# Patient Record
Sex: FEMALE | Race: WHITE | NOT HISPANIC OR LATINO | Employment: OTHER | URBAN - NONMETROPOLITAN AREA
[De-identification: names, ages, dates, MRNs, and addresses within clinical notes are randomized per-mention and may not be internally consistent; named-entity substitution may affect disease eponyms.]

---

## 2019-08-02 ENCOUNTER — INITIAL PRENATAL (OUTPATIENT)
Dept: OBSTETRICS AND GYNECOLOGY | Facility: CLINIC | Age: 24
End: 2019-08-02

## 2019-08-02 VITALS
BODY MASS INDEX: 17.85 KG/M2 | SYSTOLIC BLOOD PRESSURE: 118 MMHG | DIASTOLIC BLOOD PRESSURE: 62 MMHG | WEIGHT: 97 LBS | HEIGHT: 62 IN

## 2019-08-02 DIAGNOSIS — Z34.91 NORMAL PREGNANCY, FIRST TRIMESTER: ICD-10-CM

## 2019-08-02 DIAGNOSIS — Z34.90 PREGNANCY, UNSPECIFIED GESTATIONAL AGE: Primary | ICD-10-CM

## 2019-08-02 LAB
C TRACH RRNA SPEC DONR QL NAA+PROBE: NEGATIVE
N GONORRHOEA DNA SPEC QL NAA+PROBE: NEGATIVE

## 2019-08-02 PROCEDURE — 99203 OFFICE O/P NEW LOW 30 MIN: CPT | Performed by: NURSE PRACTITIONER

## 2019-08-02 RX ORDER — PRENATAL VIT NO.126/IRON/FOLIC 28MG-0.8MG
1 TABLET ORAL DAILY
COMMUNITY
End: 2022-08-08

## 2019-08-02 NOTE — PROGRESS NOTES
"Chief Complaint   Patient presents with   • Initial Prenatal Visit     LMP 19, 9w4d by scan today, pap due, no complaints         HPI  , 9w4d presents to our office today for initial prenatal visit.  She reports mild first trimester discomforts of pregnancy.      She and her  are happy with the pregnancy.  She does not smoke cigarettes or use recreational drugs or alcohol  She is taking her prenatal vitamin.    Previous pregnancies spontaneous vaginal deliveries without complications.  6 pounds 4 ounces      The following portions of the patient's history were reviewed as note below and updated   current medications allergies, past family history, past medical history, past social history and past surgical history.    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\"        Social History     Socioeconomic History   • Marital status:      Spouse name: Not on file   • Number of children: Not on file   • Years of education: Not on file   • Highest education level: Not on file   Tobacco Use   • Smoking status: Never Smoker   • Smokeless tobacco: Never Used   Substance and Sexual Activity   • Alcohol use: No     Frequency: Never   • Drug use: No   • Sexual activity: Yes     Partners: Male     Birth control/protection: None      Family History   Problem Relation Age of Onset   • Diabetes Maternal Grandmother        ROS  Pertinent items are noted in HPI, all other systems reviewed and negative      Physical Exam  /62   Ht 157.5 cm (62\")   Wt 44 kg (97 lb)   LMP 2019 (Approximate)   BMI 17.74 kg/m²        Psych: Altert and oriented to time, place and person  Mood and affect appropriate "   General: well developed; well nourished  no acute distress  Head: normocephalic  Neck: The neck is supple and the trachea is midline  Musculoskeletal: Normal gait  Full range of motion  Lungs:  breathing is unlabored  Back: Negative CVAT  Abdomen: Gravid - soft and non-tender   Lower Extremities: LE: Neg edema  Genitourinary: External Genitalia without erythema, lesions, or masses, Vagina - There is no excessive vaginal discharge & vaginal walls reveals moist vaginal mucosa without inflammation or lesions. There is not evidence of pelvic relaxation , Cervix  is smooth. Uterus &  Adnexa is noted per ultrasound.  Perineum is without inflammation or lesions      MDM  Impression:  Problems/Risks: Normal Pregnancy     Tests done today: Pap  Routine NOB labs / U/A Culture / GC/CT - option for CF screening   TVS rev'd    Topics discussed: Rout NOB education including nutrition, exercise, OTCmeds, genetic screening & timing   Written info on  1st trimester of pregnancy  Encourage questions & answered    Tests next visit: none

## 2019-08-02 NOTE — PATIENT INSTRUCTIONS
First Trimester of Pregnancy    The first trimester of pregnancy is from week 1 until the end of week 12 (months 1 through 3). During this time, your baby will begin to develop inside you. At 6-8 weeks, the eyes and face are formed, and the heartbeat can be seen on ultrasound. At the end of 12 weeks, all the baby's organs are formed. Prenatal care is all the medical care you receive before the birth of your baby. Make sure you get good prenatal care and follow all of your doctor's instructions.  HOME CARE   Medicines  · Take medicine only as told by your doctor. Some medicines are safe and some are not during pregnancy.  · Take your prenatal vitamins as told by your doctor.  · Take medicine that helps you poop (stool softener) as needed if your doctor says it is okay.  Diet  · Eat regular, healthy meals.  · Your doctor will tell you the amount of weight gain that is right for you.  · Avoid raw meat and uncooked cheese.  · If you feel sick to your stomach (nauseous) or throw up (vomit):  ¨ Eat 4 or 5 small meals a day instead of 3 large meals.  ¨ Try eating a few soda crackers.  ¨ Drink liquids between meals instead of during meals.  · If you have a hard time pooping (constipation):  ¨ Eat high-fiber foods like fresh vegetables, fruit, and whole grains.  ¨ Drink enough fluids to keep your pee (urine) clear or pale yellow.  Activity and Exercise  · Exercise only as told by your doctor. Stop exercising if you have cramps or pain in your lower belly (abdomen) or low back.  · Try to avoid standing for long periods of time. Move your legs often if you must  one place for a long time.  · Avoid heavy lifting.  · Wear low-heeled shoes. Sit and stand up straight.  · You can have sex unless your doctor tells you not to.  Relief of Pain or Discomfort  · Wear a good support bra if your breasts are sore.  · Take warm water baths (sitz baths) to soothe pain or discomfort caused by hemorrhoids. Use hemorrhoid cream if your  doctor says it is okay.  · Rest with your legs raised if you have leg cramps or low back pain.  · Wear support hose if you have puffy, bulging veins (varicose veins) in your legs. Raise (elevate) your feet for 15 minutes, 3-4 times a day. Limit salt in your diet.  Prenatal Care  · Schedule your prenatal visits by the twelfth week of pregnancy.  · Write down your questions. Take them to your prenatal visits.  · Keep all your prenatal visits as told by your doctor.  Safety  · Wear your seat belt at all times when driving.  · Make a list of emergency phone numbers. The list should include numbers for family, friends, the hospital, and police and fire departments.  General Tips  · Ask your doctor for a referral to a local prenatal class. Begin classes no later than at the start of month 6 of your pregnancy.  · Ask for help if you need counseling or help with nutrition. Your doctor can give you advice or tell you where to go for help.  · Do not use hot tubs, steam rooms, or saunas.  · Do not douche or use tampons or scented sanitary pads.  · Do not cross your legs for long periods of time.  · Avoid litter boxes and soil used by cats.  · Avoid all smoking, herbs, and alcohol. Avoid drugs not approved by your doctor.  · Do not use any tobacco products, including cigarettes, chewing tobacco, and electronic cigarettes. If you need help quitting, ask your doctor. You may get counseling or other support to help you quit.  · Visit your dentist. At home, brush your teeth with a soft toothbrush. Be gentle when you floss.  GET HELP IF:  · You are dizzy.  · You have mild cramps or pressure in your lower belly.  · You have a nagging pain in your belly area.  · You continue to feel sick to your stomach, throw up, or have watery poop (diarrhea).  · You have a bad smelling fluid coming from your vagina.  · You have pain with peeing (urination).  · You have increased puffiness (swelling) in your face, hands, legs, or ankles.  GET HELP  RIGHT AWAY IF:   · You have a fever.  · You are leaking fluid from your vagina.  · You have spotting or bleeding from your vagina.  · You have very bad belly cramping or pain.  · You gain or lose weight rapidly.  · You throw up blood. It may look like coffee grounds.  · You are around people who have [place] measles, fifth disease, or chickenpox.  · You have a very bad headache.  · You have shortness of breath.  · You have any kind of trauma, such as from a fall or a car accident.     This information is not intended to replace advice given to you by your health care provider. Make sure you discuss any questions you have with your health care provider.

## 2019-08-03 LAB
ABO GROUP BLD: (no result)
BASOPHILS # BLD AUTO: 0 X10E3/UL (ref 0–0.2)
BASOPHILS NFR BLD AUTO: 0 %
BLD GP AB SCN SERPL QL: NEGATIVE
EOSINOPHIL # BLD AUTO: 0.2 X10E3/UL (ref 0–0.4)
EOSINOPHIL NFR BLD AUTO: 3 %
ERYTHROCYTE [DISTWIDTH] IN BLOOD BY AUTOMATED COUNT: 12.8 % (ref 12.3–15.4)
HBV SURFACE AG SERPL QL IA: NEGATIVE
HCT VFR BLD AUTO: 33.9 % (ref 34–46.6)
HCV AB S/CO SERPL IA: <0.1 S/CO RATIO (ref 0–0.9)
HGB BLD-MCNC: 11.6 G/DL (ref 11.1–15.9)
HIV 1+2 AB+HIV1 P24 AG SERPL QL IA: NON REACTIVE
IMM GRANULOCYTES # BLD AUTO: 0 X10E3/UL (ref 0–0.1)
IMM GRANULOCYTES NFR BLD AUTO: 0 %
LYMPHOCYTES # BLD AUTO: 1.6 X10E3/UL (ref 0.7–3.1)
LYMPHOCYTES NFR BLD AUTO: 22 %
MCH RBC QN AUTO: 30.9 PG (ref 26.6–33)
MCHC RBC AUTO-ENTMCNC: 34.2 G/DL (ref 31.5–35.7)
MCV RBC AUTO: 90 FL (ref 79–97)
MONOCYTES # BLD AUTO: 0.5 X10E3/UL (ref 0.1–0.9)
MONOCYTES NFR BLD AUTO: 6 %
NEUTROPHILS # BLD AUTO: 5.1 X10E3/UL (ref 1.4–7)
NEUTROPHILS NFR BLD AUTO: 69 %
PLATELET # BLD AUTO: 262 X10E3/UL (ref 150–450)
RBC # BLD AUTO: 3.76 X10E6/UL (ref 3.77–5.28)
RH BLD: POSITIVE
RPR SER QL: NON REACTIVE
RUBV IGG SERPL IA-ACNC: 1.92 INDEX
WBC # BLD AUTO: 7.4 X10E3/UL (ref 3.4–10.8)

## 2019-08-15 DIAGNOSIS — Z34.90 PREGNANCY, UNSPECIFIED GESTATIONAL AGE: ICD-10-CM

## 2019-09-13 ENCOUNTER — ROUTINE PRENATAL (OUTPATIENT)
Dept: OBSTETRICS AND GYNECOLOGY | Facility: CLINIC | Age: 24
End: 2019-09-13

## 2019-09-13 VITALS — BODY MASS INDEX: 18.11 KG/M2 | DIASTOLIC BLOOD PRESSURE: 60 MMHG | WEIGHT: 99 LBS | SYSTOLIC BLOOD PRESSURE: 112 MMHG

## 2019-09-13 DIAGNOSIS — Z34.92 NORMAL PREGNANCY, SECOND TRIMESTER: Primary | ICD-10-CM

## 2019-09-13 PROCEDURE — 99213 OFFICE O/P EST LOW 20 MIN: CPT | Performed by: NURSE PRACTITIONER

## 2019-09-13 NOTE — PATIENT INSTRUCTIONS
Second Trimester of Pregnancy  The second trimester is from week 13 through week 28, months 4 through 6. The second trimester is often a time when you feel your best. Your body has also adjusted to being pregnant, and you begin to feel better physically. Usually, morning sickness has lessened or quit completely, you may have more energy, and you may have an increase in appetite. The second trimester is also a time when the fetus is growing rapidly. At the end of the sixth month, the fetus is about 9 inches long and weighs about 1½ pounds. You will likely begin to feel the baby move (quickening) between 18 and 20 weeks of the pregnancy.  BODY CHANGES  Your body goes through many changes during pregnancy. The changes vary from woman to woman.   · Your weight will continue to increase. You will notice your lower abdomen bulging out.  · You may begin to get stretch marks on your hips, abdomen, and breasts.  · You may develop headaches that can be relieved by medicines approved by your health care provider.  · You may urinate more often because the fetus is pressing on your bladder.  · You may develop or continue to have heartburn as a result of your pregnancy.  · You may develop constipation because certain hormones are causing the muscles that push waste through your intestines to slow down.  · You may develop hemorrhoids or swollen, bulging veins (varicose veins).  · You may have back pain because of the weight gain and pregnancy hormones relaxing your joints between the bones in your pelvis and as a result of a shift in weight and the muscles that support your balance.  · Your breasts will continue to grow and be tender.  · Your gums may bleed and may be sensitive to brushing and flossing.  · Dark spots or blotches (chloasma, mask of pregnancy) may develop on your face. This will likely fade after the baby is born.  · A dark line from your belly button to the pubic area (linea nigra) may appear. This will likely fade  after the baby is born.  · You may have changes in your hair. These can include thickening of your hair, rapid growth, and changes in texture. Some women also have hair loss during or after pregnancy, or hair that feels dry or thin. Your hair will most likely return to normal after your baby is born.  WHAT TO EXPECT AT YOUR PRENATAL VISITS  During a routine prenatal visit:  · You will be weighed to make sure you and the fetus are growing normally.  · Your blood pressure will be taken.  · Your abdomen will be measured to track your baby's growth.  · The fetal heartbeat will be listened to.  · Any test results from the previous visit will be discussed.  Your health care provider may ask you:  · How you are feeling.  · If you are feeling the baby move.  · If you have had any abnormal symptoms, such as leaking fluid, bleeding, severe headaches, or abdominal cramping.  · If you are using any tobacco products, including cigarettes, chewing tobacco, and electronic cigarettes.  · If you have any questions.  Other tests that may be performed during your second trimester include:  · Blood tests that check for:  ¨ Low iron levels (anemia).  ¨ Gestational diabetes (between 24 and 28 weeks).  ¨ Rh antibodies.  · Urine tests to check for infections, diabetes, or protein in the urine.  · An ultrasound to confirm the proper growth and development of the baby.  · An amniocentesis to check for possible genetic problems.  · Fetal screens for spina bifida and Down syndrome.  · HIV (human immunodeficiency virus) testing. Routine prenatal testing includes screening for HIV, unless you choose not to have this test.  HOME CARE INSTRUCTIONS   · Avoid all smoking, herbs, alcohol, and unprescribed drugs. These chemicals affect the formation and growth of the baby.  · Do not use any tobacco products, including cigarettes, chewing tobacco, and electronic cigarettes. If you need help quitting, ask your health care provider. You may receive  counseling support and other resources to help you quit.  · Follow your health care provider's instructions regarding medicine use. There are medicines that are either safe or unsafe to take during pregnancy.  · Exercise only as directed by your health care provider. Experiencing uterine cramps is a good sign to stop exercising.  · Continue to eat regular, healthy meals.  · Wear a good support bra for breast tenderness.  · Do not use hot tubs, steam rooms, or saunas.  · Wear your seat belt at all times when driving.  · Avoid raw meat, uncooked cheese, cat litter boxes, and soil used by cats. These carry germs that can cause birth defects in the baby.  · Take your prenatal vitamins.  · Take [id number] mg of calcium daily starting at the 20th week of pregnancy until you deliver your baby.  · Try taking a stool softener (if your health care provider approves) if you develop constipation. Eat more high-fiber foods, such as fresh vegetables or fruit and whole grains. Drink plenty of fluids to keep your urine clear or pale yellow.  · Take warm sitz baths to soothe any pain or discomfort caused by hemorrhoids. Use hemorrhoid cream if your health care provider approves.  · If you develop varicose veins, wear support hose. Elevate your feet for 15 minutes, 3-4 times a day. Limit salt in your diet.  · Avoid heavy lifting, wear low heel shoes, and practice good posture.  · Rest with your legs elevated if you have leg cramps or low back pain.  · Visit your dentist if you have not gone yet during your pregnancy. Use a soft toothbrush to brush your teeth and be gentle when you floss.  · A sexual relationship may be continued unless your health care provider directs you otherwise.  · Continue to go to all your prenatal visits as directed by your health care provider.  SEEK MEDICAL CARE IF:   · You have dizziness.  · You have mild pelvic cramps, pelvic pressure, or nagging pain in the abdominal area.  · You have persistent nausea,  vomiting, or diarrhea.  · You have a bad smelling vaginal discharge.  · You have pain with urination.  SEEK IMMEDIATE MEDICAL CARE IF:   · You have a fever.  · You are leaking fluid from your vagina.  · You have spotting or bleeding from your vagina.  · You have severe abdominal cramping or pain.  · You have rapid weight gain or loss.  · You have shortness of breath with chest pain.  · You notice sudden or extreme swelling of your face, hands, ankles, feet, or legs.  · You have not felt your baby move in over an hour.  · You have severe headaches that do not go away with medicine.  · You have vision changes.     This information is not intended to replace advice given to you by your health care provider. Make sure you discuss any questions you have with your health care provider.

## 2019-09-13 NOTE — PROGRESS NOTES
[id number]  Chief Complaint   Patient presents with   • Routine Prenatal Visit     c/o right side back pain         HPI  Hanna is a  currently at 15w4d who today reports the following:    States had a pain right side of back x 1 on the way here - didn't last long - no hx of renal stones - no s/s UTI   Doing well       Contractions - No;   Leaking - No;   Vaginal bleeding -  No;   Swelling of extremities - No.       ROS:        GI: Nausea - No;   Constipation - No;   Diarrhea - No    Neuro: Headache - No;   Visual change - No    Pertinent items are noted in HPI, all other systems reviewed and negative      EXAM  /60   Wt 44.9 kg (99 lb)   LMP 2019 (Approximate)   BMI 18.11 kg/m²  -See Prenatal Assessment  General Appearance:  Pleasant  Back: neg CVAT   Lungs: Breathing unlabored  Abdomen:  See flow sheet for Fundal ht, FM, FHT's  LE: Neg edema  V/E: Not performed     Lab Results   Component Value Date    ABO O 2019    RH Positive 2019    ABSCRN Negative 2019       MDM  Impression: Supervision of normal pregnancy   Tests done today: MSAFP - discussed & optional   Topics discussed: written info on 2nd trimester of pregnancy   encouraged questions - call prn    Tests next visit: none         Current Outpatient Medications:   •  Prenatal Vit-Fe Fumarate-FA (PRENATAL, CLASSIC, VITAMIN) 28-0.8 MG tablet tablet, Take 1 tablet by mouth Daily., Disp: , Rfl:     Social History     Socioeconomic History   • Marital status:      Spouse name: Not on file   • Number of children: Not on file   • Years of education: Not on file   • Highest education level: Not on file   Tobacco Use   • Smoking status: Never Smoker   • Smokeless tobacco: Never Used   Substance and Sexual Activity   • Alcohol use: No     Frequency: Never   • Drug use: No   • Sexual activity: Yes     Partners: Male     Birth control/protection: None       Past Surgical History:   Procedure Laterality Date   • HIP SURGERY       "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

## 2019-10-07 ENCOUNTER — ROUTINE PRENATAL (OUTPATIENT)
Dept: OBSTETRICS AND GYNECOLOGY | Facility: CLINIC | Age: 24
End: 2019-10-07

## 2019-10-07 VITALS — DIASTOLIC BLOOD PRESSURE: 60 MMHG | WEIGHT: 106 LBS | SYSTOLIC BLOOD PRESSURE: 116 MMHG | BODY MASS INDEX: 19.39 KG/M2

## 2019-10-07 DIAGNOSIS — Z34.92 NORMAL PREGNANCY, SECOND TRIMESTER: ICD-10-CM

## 2019-10-07 DIAGNOSIS — Z36.89 SCREENING, ANTENATAL, FOR FETAL ANATOMIC SURVEY: Primary | ICD-10-CM

## 2019-10-07 PROCEDURE — 90674 CCIIV4 VAC NO PRSV 0.5 ML IM: CPT | Performed by: NURSE PRACTITIONER

## 2019-10-07 PROCEDURE — 90471 IMMUNIZATION ADMIN: CPT | Performed by: NURSE PRACTITIONER

## 2019-10-07 PROCEDURE — 99213 OFFICE O/P EST LOW 20 MIN: CPT | Performed by: NURSE PRACTITIONER

## 2019-10-07 NOTE — PROGRESS NOTES
"[id number]  Chief Complaint   Patient presents with   • Routine Prenatal Visit     anatomy scan today, no complaints         HPI  Hanna is a  currently at 19w0d who today reports the following:    No c/o   Doing well    Contractions - No;   Leaking - No;   Vaginal bleeding -  No;   Swelling of extremities - No.       ROS:        GI: Nausea - No;   Constipation - No;   Diarrhea - No    Neuro: Headache - No;   Visual change - No    Pertinent items are noted in HPI, all other systems reviewed and negative      EXAM  /60   Wt 48.1 kg (106 lb)   LMP 2019 (Approximate)   BMI 19.39 kg/m²  -See Prenatal Assessment  General Appearance:  Pleasant  Lungs: Breathing unlabored  Abdomen:  See flow sheet for Fundal ht, FM, FHT's  LE: Neg edema  V/E: Not performed     Lab Results   Component Value Date    ABO O 2019    RH Positive 2019    ABSCRN Negative 2019       MDM  Impression: Supervision of normal pregnancy   Tests done today: U/S  & rev'd results   MSAFP - discussed & optional   Topics discussed: continue to note good FM  Flu vaccination - wanted / done   encouraged questions - call prn    Tests next visit: none         Current Outpatient Medications:   •  Prenatal Vit-Fe Fumarate-FA (PRENATAL, CLASSIC, VITAMIN) 28-0.8 MG tablet tablet, Take 1 tablet by mouth Daily., Disp: , Rfl:     Social History     Socioeconomic History   • Marital status:      Spouse name: Not on file   • Number of children: Not on file   • Years of education: Not on file   • Highest education level: Not on file   Tobacco Use   • Smoking status: Never Smoker   • Smokeless tobacco: Never Used   Substance and Sexual Activity   • Alcohol use: No     Frequency: Never   • Drug use: No   • Sexual activity: Yes     Partners: Male     Birth control/protection: None       Past Surgical History:   Procedure Laterality Date   • HIP SURGERY      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

## 2019-11-06 ENCOUNTER — ROUTINE PRENATAL (OUTPATIENT)
Dept: OBSTETRICS AND GYNECOLOGY | Facility: CLINIC | Age: 24
End: 2019-11-06

## 2019-11-06 VITALS — BODY MASS INDEX: 19.94 KG/M2 | DIASTOLIC BLOOD PRESSURE: 72 MMHG | SYSTOLIC BLOOD PRESSURE: 112 MMHG | WEIGHT: 109 LBS

## 2019-11-06 DIAGNOSIS — Z34.92 SECOND TRIMESTER PREGNANCY: Primary | ICD-10-CM

## 2019-11-06 PROCEDURE — 99213 OFFICE O/P EST LOW 20 MIN: CPT | Performed by: OBSTETRICS & GYNECOLOGY

## 2019-11-06 NOTE — PROGRESS NOTES
Chief Complaint   Patient presents with   • Routine Prenatal Visit     No Complaints/concerns         HPI:   , 23w2d gestation reports doing well    ROS:  See Prenatal Episode/Flowsheet  /72   Wt 49.4 kg (109 lb)   LMP 2019 (Approximate)   BMI 19.94 kg/m²      EXAM:  EXTREMITIES:  No swelling-See Prenatal Episode/Flowsheet    ABDOMEN:  FHTs/Movement noted-See Prenatal Episode/Flowsheet    URINE GLUCOSE/PROTEIN:  See Prenatal Episode/Flowsheet    PELVIC EXAM:  See Prenatal Episode/Flowsheet  CV:  Lungs:    MDM:    Lab Results   Component Value Date    HGB 11.6 2019    RUBELLAABIGG 1.92 2019    HEPBSAG Negative 2019    ABO O 2019    RH Positive 2019    ABSCRN Negative 2019    HVT2LDU4 Non Reactive 2019    HEPCVIRUSABY <0.1 2019       U/S:    1. IUP 23w2d  2. Routine care   3. Gluocla CBC next time

## 2019-12-02 ENCOUNTER — ROUTINE PRENATAL (OUTPATIENT)
Dept: OBSTETRICS AND GYNECOLOGY | Facility: CLINIC | Age: 24
End: 2019-12-02

## 2019-12-02 VITALS — WEIGHT: 117 LBS | DIASTOLIC BLOOD PRESSURE: 58 MMHG | BODY MASS INDEX: 21.4 KG/M2 | SYSTOLIC BLOOD PRESSURE: 102 MMHG

## 2019-12-02 DIAGNOSIS — Z34.92 PRENATAL CARE IN SECOND TRIMESTER: Primary | ICD-10-CM

## 2019-12-02 DIAGNOSIS — Z34.92 SECOND TRIMESTER PREGNANCY: ICD-10-CM

## 2019-12-02 LAB
BASOPHILS # BLD AUTO: 0.05 10*3/MM3 (ref 0–0.2)
BASOPHILS NFR BLD AUTO: 0.7 % (ref 0–1.5)
EOSINOPHIL # BLD AUTO: 0.09 10*3/MM3 (ref 0–0.4)
EOSINOPHIL NFR BLD AUTO: 1.2 % (ref 0.3–6.2)
ERYTHROCYTE [DISTWIDTH] IN BLOOD BY AUTOMATED COUNT: 11.8 % (ref 12.3–15.4)
GLUCOSE 1H P 50 G GLC PO SERPL-MCNC: 102 MG/DL (ref 65–179)
HCT VFR BLD AUTO: 30.1 % (ref 34–46.6)
HGB BLD-MCNC: 10.4 G/DL (ref 12–15.9)
IMM GRANULOCYTES # BLD AUTO: 0.04 10*3/MM3 (ref 0–0.05)
IMM GRANULOCYTES NFR BLD AUTO: 0.5 % (ref 0–0.5)
LYMPHOCYTES # BLD AUTO: 1.47 10*3/MM3 (ref 0.7–3.1)
LYMPHOCYTES NFR BLD AUTO: 19.4 % (ref 19.6–45.3)
MCH RBC QN AUTO: 32.5 PG (ref 26.6–33)
MCHC RBC AUTO-ENTMCNC: 34.6 G/DL (ref 31.5–35.7)
MCV RBC AUTO: 94.1 FL (ref 79–97)
MONOCYTES # BLD AUTO: 0.52 10*3/MM3 (ref 0.1–0.9)
MONOCYTES NFR BLD AUTO: 6.9 % (ref 5–12)
NEUTROPHILS # BLD AUTO: 5.39 10*3/MM3 (ref 1.7–7)
NEUTROPHILS NFR BLD AUTO: 71.3 % (ref 42.7–76)
NRBC BLD AUTO-RTO: 0 /100 WBC (ref 0–0.2)
PLATELET # BLD AUTO: 226 10*3/MM3 (ref 140–450)
RBC # BLD AUTO: 3.2 10*6/MM3 (ref 3.77–5.28)
WBC # BLD AUTO: 7.56 10*3/MM3 (ref 3.4–10.8)

## 2019-12-02 PROCEDURE — 99213 OFFICE O/P EST LOW 20 MIN: CPT | Performed by: OBSTETRICS & GYNECOLOGY

## 2019-12-02 NOTE — PROGRESS NOTES
Chief Complaint   Patient presents with   • Routine Prenatal Visit     Glucola done today, no complaints.       HPI:   Hanna is a  currently at 27w0d who today reports the following:  Contractions - No; Leaking - No; Vaginal bleeding -  No; Swelling of extremities - No. Good fetal movement - YES.    ROS:  GI: Nausea - No; Constipation - No; Diarrhea - No. RUQ pain - No    Neuro: Headache - No; Visual disturbances - No.    Pertinent items are noted in HPI, all other systems reviewed and negative    Review of History:  The following portions of the patient's history were reviewed and updated as appropriate:problem list, current medications, allergies, past family history, past medical history, past social history and past surgical history.    Current Outpatient Medications on File Prior to Visit   Medication Sig Dispense Refill   • Prenatal Vit-Fe Fumarate-FA (PRENATAL, CLASSIC, VITAMIN) 28-0.8 MG tablet tablet Take 1 tablet by mouth Daily.       No current facility-administered medications on file prior to visit.        EXAM:  /58   Wt 53.1 kg (117 lb)   LMP 2019 (Approximate)   BMI 21.40 kg/m²     Gen: NAD, conversant  Pulm: No use of accessory muscles, normal respirations  Abdomen: Gravid, nontender, size = dates, + fetal cardiac activity  Ext: no edema, no rashes, WWP  Gait: normal for pregnancy  Psych: Mood, insight, judgement intact  SVE: Not performed     Lab Results   Component Value Date    ABO O 2019    RH Positive 2019    ABSCRN Negative 2019       Social History    Tobacco Use      Smoking status: Never Smoker      Smokeless tobacco: Never Used      I have reviewed the prenatal labs and previous ultrasounds today.    MDM:  Diagnosis: Supervision of low risk pregnancy   Tests/Orders/Rx today: Orders Placed This Encounter   Procedures   • CBC & Differential     Order Specific Question:   Manual Differential     Answer:   No     Meds Ordered: none   Topics discussed:  Prenatal care milestones   Tests next visit: none   Next visit: 3 week(s)     Mir Rashid MD  Obstetrics and Gynecology  [facility]

## 2019-12-03 DIAGNOSIS — D50.9 IRON DEFICIENCY ANEMIA DURING PREGNANCY: Primary | ICD-10-CM

## 2019-12-03 DIAGNOSIS — O99.019 IRON DEFICIENCY ANEMIA DURING PREGNANCY: Primary | ICD-10-CM

## 2019-12-03 RX ORDER — FERROUS SULFATE 325(65) MG
325 TABLET ORAL
Qty: 60 TABLET | Refills: 6 | Status: SHIPPED | OUTPATIENT
Start: 2019-12-03 | End: 2020-02-12 | Stop reason: HOSPADM

## 2019-12-04 ENCOUNTER — RESULTS ENCOUNTER (OUTPATIENT)
Dept: OBSTETRICS AND GYNECOLOGY | Facility: CLINIC | Age: 24
End: 2019-12-04

## 2019-12-04 DIAGNOSIS — Z34.92 SECOND TRIMESTER PREGNANCY: ICD-10-CM

## 2019-12-06 ENCOUNTER — RESULTS ENCOUNTER (OUTPATIENT)
Dept: OBSTETRICS AND GYNECOLOGY | Facility: CLINIC | Age: 24
End: 2019-12-06

## 2019-12-06 DIAGNOSIS — Z34.92 SECOND TRIMESTER PREGNANCY: ICD-10-CM

## 2019-12-30 ENCOUNTER — ROUTINE PRENATAL (OUTPATIENT)
Dept: OBSTETRICS AND GYNECOLOGY | Facility: CLINIC | Age: 24
End: 2019-12-30

## 2019-12-30 VITALS — BODY MASS INDEX: 21.77 KG/M2 | WEIGHT: 119 LBS | SYSTOLIC BLOOD PRESSURE: 118 MMHG | DIASTOLIC BLOOD PRESSURE: 66 MMHG

## 2019-12-30 DIAGNOSIS — O22.03 OBSTETRIC VARICOSE VEINS IN THIRD TRIMESTER: Primary | ICD-10-CM

## 2019-12-30 DIAGNOSIS — D50.9 IRON DEFICIENCY ANEMIA DURING PREGNANCY: ICD-10-CM

## 2019-12-30 DIAGNOSIS — O99.019 IRON DEFICIENCY ANEMIA DURING PREGNANCY: ICD-10-CM

## 2019-12-30 PROCEDURE — 99213 OFFICE O/P EST LOW 20 MIN: CPT | Performed by: NURSE PRACTITIONER

## 2019-12-30 NOTE — PROGRESS NOTES
[id number]  Chief Complaint   Patient presents with   • Routine Prenatal Visit     states she slipped on a toy yesterday and has had leg pain and pelvic pain since then, also c/o varicose veins         HPI  Hanna is a  currently at 31w0d who today reports the following:    Slipped yesterday - caught self - leg stretched   C/O varicose veins   Good FM     Contractions - No;   Leaking - No;   Vaginal bleeding -  No;   Swelling of extremities - No.       ROS:        GI: Nausea - No;   Constipation - No;   Diarrhea - No    Neuro: Headache - No;   Visual change - No    Pertinent items are noted in HPI, all other systems reviewed and negative      EXAM  /66   Wt 54 kg (119 lb)   LMP 2019 (Approximate)   BMI 21.77 kg/m²  -See Prenatal Assessment  General Appearance:  Pleasant  Lungs: Breathing unlabored  Abdomen:  See flow sheet for Fundal ht, FM, FHT's  LE: Neg edema  + for varicose veins - specifically left leg   V/E: Not performed   + vaginal varicosity also     Lab Results   Component Value Date    ABO O 2019    RH Positive 2019    ABSCRN Negative 2019       MDM  Impression: Pregnancy with active problem(s) &/or complication(s)   Tests done today: none   Topics discussed: continue to note good FM  Flu vaccination  T-dap    Anemia / iron / foods high in iron  s/s PTL  discussed maternity girdle / preventive measures of varicosity / cool ice pack to perineum iprn  encouraged questions - call prn    Tests next visit: u/s         Current Outpatient Medications:   •  ferrous sulfate 325 (65 FE) MG tablet, Take 1 tablet by mouth 2 (Two) Times a Day Before Meals., Disp: 60 tablet, Rfl: 6  •  Prenatal Vit-Fe Fumarate-FA (PRENATAL, CLASSIC, VITAMIN) 28-0.8 MG tablet tablet, Take 1 tablet by mouth Daily., Disp: , Rfl:     Social History     Socioeconomic History   • Marital status:      Spouse name: Not on file   • Number of children: Not on file   • Years of education: Not on file   •  "Highest education level: Not on file   Tobacco Use   • Smoking status: Never Smoker   • Smokeless tobacco: Never Used   Substance and Sexual Activity   • Alcohol use: No     Frequency: Never   • Drug use: No   • Sexual activity: Yes     Partners: Male     Birth control/protection: None       Past Surgical History:   Procedure Laterality Date   • HIP SURGERY      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

## 2020-01-06 ENCOUNTER — ROUTINE PRENATAL (OUTPATIENT)
Dept: OBSTETRICS AND GYNECOLOGY | Facility: CLINIC | Age: 25
End: 2020-01-06

## 2020-01-06 VITALS — WEIGHT: 119 LBS | DIASTOLIC BLOOD PRESSURE: 60 MMHG | BODY MASS INDEX: 21.77 KG/M2 | SYSTOLIC BLOOD PRESSURE: 108 MMHG

## 2020-01-06 DIAGNOSIS — Z34.83 ENCOUNTER FOR SUPERVISION OF OTHER NORMAL PREGNANCY IN THIRD TRIMESTER: ICD-10-CM

## 2020-01-06 DIAGNOSIS — Z36.89 ENCOUNTER FOR ULTRASOUND TO CHECK FETAL GROWTH: Primary | ICD-10-CM

## 2020-01-06 PROCEDURE — 99212 OFFICE O/P EST SF 10 MIN: CPT | Performed by: MIDWIFE

## 2020-01-06 NOTE — PROGRESS NOTES
Chief Complaint   Patient presents with   • Routine Prenatal Visit     Growth Scan, mild carmencita hick contractions, No Complaints/concerns        HPI: Hanna is a  currently at 32w0d who today reports the following:  Contractions - YES - irregular; Leaking - No; Vaginal bleeding -  No; Swelling of extremities - No; Baby is active - YES    ROS:   GI:   Nausea - No; Constipation - No   Neuro:  Headache - No; Visual disturbances - No.    Social History    Tobacco Use      Smoking status: Never Smoker      Smokeless tobacco: Never Used      EXAM:   Vitals:  See prenatal flowsheet, /60, Wt no change   Abdomen:   See prenatal flowsheet as noted and reviewed, soft, nontender   Pelvic:  See prenatal flowsheet   Urine:  See prenatal flowsheet as noted and reviewed    MDM:  Impression: Supervision of low risk pregnancy   Tests done today: U/S for EFW - 3#12oz, 15%, URIEL 9.79, s/d 2.99, vtx   Topics discussed: kick counts and fetal movement   labor signs and symptoms  Will monitor fetal growth and may need another US later in pregnancy. Increase water   Tests next visit: none   Next visit: 2 weeks     This note was electronically signed.  Iram Condon, APRN  2020

## 2020-01-21 ENCOUNTER — ROUTINE PRENATAL (OUTPATIENT)
Dept: OBSTETRICS AND GYNECOLOGY | Facility: CLINIC | Age: 25
End: 2020-01-21

## 2020-01-21 VITALS — SYSTOLIC BLOOD PRESSURE: 110 MMHG | BODY MASS INDEX: 21.77 KG/M2 | WEIGHT: 119 LBS | DIASTOLIC BLOOD PRESSURE: 66 MMHG

## 2020-01-21 DIAGNOSIS — O26.849 UTERINE SIZE DATE DISCREPANCY PREGNANCY: ICD-10-CM

## 2020-01-21 DIAGNOSIS — Z34.93 THIRD TRIMESTER PREGNANCY: Primary | ICD-10-CM

## 2020-01-21 PROCEDURE — 99213 OFFICE O/P EST LOW 20 MIN: CPT | Performed by: OBSTETRICS & GYNECOLOGY

## 2020-01-21 NOTE — PROGRESS NOTES
Chief Complaint   Patient presents with   • Routine Prenatal Visit     No Complaints/concerns, Good fetal movement         HPI:   , 34w1d gestation reports doing well    ROS:  See Prenatal Episode/Flowsheet  /66   Wt 54 kg (119 lb)   LMP 2019 (Approximate)   BMI 21.77 kg/m²      EXAM:  EXTREMITIES:  No swelling-See Prenatal Episode/Flowsheet    ABDOMEN:  FHTs/Movement noted-See Prenatal Episode/Flowsheet    URINE GLUCOSE/PROTEIN:  See Prenatal Episode/Flowsheet    PELVIC EXAM:  See Prenatal Episode/Flowsheet  CV:  Lungs:  GYN:    MDM:    Lab Results   Component Value Date    HGB 10.4 (L) 2019    RUBELLAABIGG 1.92 2019    HEPBSAG Negative 2019    ABO O 2019    RH Positive 2019    ABSCRN Negative 2019    MCV2WLH8 Non Reactive 2019    HEPCVIRUSABY <0.1 2019       U/S:    1. IUP 34w1d  2. Routine care   3. Anemia: Taking Fe  3. S<D based on last U/S-- repeat 36 weeks

## 2020-02-04 ENCOUNTER — ROUTINE PRENATAL (OUTPATIENT)
Dept: OBSTETRICS AND GYNECOLOGY | Facility: CLINIC | Age: 25
End: 2020-02-04

## 2020-02-04 VITALS — SYSTOLIC BLOOD PRESSURE: 104 MMHG | DIASTOLIC BLOOD PRESSURE: 60 MMHG | WEIGHT: 125 LBS | BODY MASS INDEX: 22.86 KG/M2

## 2020-02-04 DIAGNOSIS — Z34.93 PRENATAL CARE IN THIRD TRIMESTER: Primary | ICD-10-CM

## 2020-02-04 DIAGNOSIS — O36.5939 SGA (SMALL FOR GESTATIONAL AGE), FETAL, AFFECTING CARE OF MOTHER, ANTEPARTUM, THIRD TRIMESTER, OTHER FETUS: ICD-10-CM

## 2020-02-04 PROCEDURE — 99213 OFFICE O/P EST LOW 20 MIN: CPT | Performed by: OBSTETRICS & GYNECOLOGY

## 2020-02-04 NOTE — PROGRESS NOTES
Chief Complaint   Patient presents with   • Routine Prenatal Visit     Growth scan and GBS done today, no complaints.       HPI:   Hanna is a  currently at 36w1d who today reports the following:  Contractions - No; Leaking - No; Vaginal bleeding -  No; Swelling of extremities - No. Good fetal movement - YES.    ROS:  GI: Nausea - No; Constipation - No; Diarrhea - No. RUQ pain - No    Neuro: Headache - No; Visual disturbances - No.    Pertinent items are noted in HPI, all other systems reviewed and negative    Review of History:  The following portions of the patient's history were reviewed and updated as appropriate:problem list, current medications, allergies, past family history, past medical history, past social history and past surgical history.    Current Outpatient Medications on File Prior to Visit   Medication Sig Dispense Refill   • ferrous sulfate 325 (65 FE) MG tablet Take 1 tablet by mouth 2 (Two) Times a Day Before Meals. 60 tablet 6   • Prenatal Vit-Fe Fumarate-FA (PRENATAL, CLASSIC, VITAMIN) 28-0.8 MG tablet tablet Take 1 tablet by mouth Daily.       No current facility-administered medications on file prior to visit.        EXAM:  /60   Wt 56.7 kg (125 lb)   LMP 2019 (Approximate)   BMI 22.86 kg/m²     Gen: NAD, conversant  Pulm: No use of accessory muscles, normal respirations  Abdomen: Gravid, nontender, size = dates, + fetal cardiac activity  Ext: no edema, no rashes, WWP  Gait: normal for pregnancy  Psych: Mood, insight, judgement intact  SVE: Not performed     Lab Results   Component Value Date    ABO O 2019    RH Positive 2019    ABSCRN Negative 2019       Social History    Tobacco Use      Smoking status: Never Smoker      Smokeless tobacco: Never Used      I have reviewed the prenatal labs and previous ultrasounds today.    MDM:  Diagnosis: Supervision of low risk pregnancy   SGA   Tests/Orders/Rx today: No orders of the defined types were placed in  this encounter.    Meds Ordered: none   Topics discussed: kick counts and fetal movement  PIH precautions   labor signs and symptoms   U/S findings   Tests next visit: none   Next visit: 1 week(s)     Mir Rashid MD  Obstetrics and Gynecology  [facility]

## 2020-02-08 LAB
CLINDAMYCIN ISLT KB: NORMAL
GP B STREP DNA SPEC QL NAA+PROBE: POSITIVE
ORGANISM ID: NORMAL

## 2020-02-10 ENCOUNTER — HOSPITAL ENCOUNTER (INPATIENT)
Facility: HOSPITAL | Age: 25
LOS: 2 days | Discharge: HOME | End: 2020-02-12
Attending: MIDWIFE | Admitting: OBSTETRICS & GYNECOLOGY

## 2020-02-10 PROBLEM — Z34.90 PREGNANCY: Status: ACTIVE | Noted: 2020-02-10

## 2020-02-10 LAB
A1 MICROGLOB PLACENTAL VAG QL: NEGATIVE
ABO GROUP BLD: NORMAL
BASOPHILS # BLD AUTO: 0.05 10*3/MM3 (ref 0–0.2)
BASOPHILS NFR BLD AUTO: 0.4 % (ref 0–1.5)
BILIRUB BLD-MCNC: NEGATIVE MG/DL
BLD GP AB SCN SERPL QL: NEGATIVE
CLARITY, POC: CLEAR
COLOR UR: YELLOW
DEPRECATED RDW RBC AUTO: 43.9 FL (ref 37–54)
EOSINOPHIL # BLD AUTO: 0.05 10*3/MM3 (ref 0–0.4)
EOSINOPHIL NFR BLD AUTO: 0.4 % (ref 0.3–6.2)
ERYTHROCYTE [DISTWIDTH] IN BLOOD BY AUTOMATED COUNT: 12.2 % (ref 12.3–15.4)
GLUCOSE UR STRIP-MCNC: NEGATIVE MG/DL
HCT VFR BLD AUTO: 37.9 % (ref 34–46.6)
HGB BLD-MCNC: 12.5 G/DL (ref 12–15.9)
IMM GRANULOCYTES # BLD AUTO: 0.05 10*3/MM3 (ref 0–0.05)
IMM GRANULOCYTES NFR BLD AUTO: 0.4 % (ref 0–0.5)
KETONES UR QL: NEGATIVE
LEUKOCYTE EST, POC: NEGATIVE
LYMPHOCYTES # BLD AUTO: 1.81 10*3/MM3 (ref 0.7–3.1)
LYMPHOCYTES NFR BLD AUTO: 14.6 % (ref 19.6–45.3)
MCH RBC QN AUTO: 32.1 PG (ref 26.6–33)
MCHC RBC AUTO-ENTMCNC: 33 G/DL (ref 31.5–35.7)
MCV RBC AUTO: 97.4 FL (ref 79–97)
MONOCYTES # BLD AUTO: 0.91 10*3/MM3 (ref 0.1–0.9)
MONOCYTES NFR BLD AUTO: 7.3 % (ref 5–12)
NEUTROPHILS # BLD AUTO: 9.56 10*3/MM3 (ref 1.7–7)
NEUTROPHILS NFR BLD AUTO: 76.9 % (ref 42.7–76)
NITRITE UR-MCNC: NEGATIVE MG/ML
NRBC BLD AUTO-RTO: 0 /100 WBC (ref 0–0.2)
PH UR: 6.5 [PH] (ref 5–8)
PLATELET # BLD AUTO: 220 10*3/MM3 (ref 140–450)
PMV BLD AUTO: 11 FL (ref 6–12)
PROT UR STRIP-MCNC: NEGATIVE MG/DL
RBC # BLD AUTO: 3.89 10*6/MM3 (ref 3.77–5.28)
RBC # UR STRIP: NEGATIVE /UL
RH BLD: POSITIVE
SP GR UR: 1.01 (ref 1–1.03)
T&S EXPIRATION DATE: NORMAL
UROBILINOGEN UR QL: NORMAL
WBC NRBC COR # BLD: 12.43 10*3/MM3 (ref 3.4–10.8)

## 2020-02-10 PROCEDURE — 85025 COMPLETE CBC W/AUTO DIFF WBC: CPT | Performed by: MIDWIFE

## 2020-02-10 PROCEDURE — 86850 RBC ANTIBODY SCREEN: CPT | Performed by: MIDWIFE

## 2020-02-10 PROCEDURE — 25010000002 PENICILLIN G POTASSIUM PER 600000 UNITS: Performed by: MIDWIFE

## 2020-02-10 PROCEDURE — 59025 FETAL NON-STRESS TEST: CPT

## 2020-02-10 PROCEDURE — 86900 BLOOD TYPING SEROLOGIC ABO: CPT | Performed by: MIDWIFE

## 2020-02-10 PROCEDURE — 81002 URINALYSIS NONAUTO W/O SCOPE: CPT | Performed by: MIDWIFE

## 2020-02-10 PROCEDURE — 59410 OBSTETRICAL CARE: CPT | Performed by: MIDWIFE

## 2020-02-10 PROCEDURE — 84112 EVAL AMNIOTIC FLUID PROTEIN: CPT | Performed by: MIDWIFE

## 2020-02-10 PROCEDURE — 86901 BLOOD TYPING SEROLOGIC RH(D): CPT | Performed by: MIDWIFE

## 2020-02-10 PROCEDURE — 4A1HX4Z MONITORING OF PRODUCTS OF CONCEPTION, CARDIAC ELECTRICAL ACTIVITY, EXTERNAL APPROACH: ICD-10-PCS | Performed by: MIDWIFE

## 2020-02-10 PROCEDURE — G0463 HOSPITAL OUTPT CLINIC VISIT: HCPCS

## 2020-02-10 PROCEDURE — 0HQ9XZZ REPAIR PERINEUM SKIN, EXTERNAL APPROACH: ICD-10-PCS | Performed by: MIDWIFE

## 2020-02-10 RX ORDER — SODIUM CHLORIDE 0.9 % (FLUSH) 0.9 %
1-10 SYRINGE (ML) INJECTION AS NEEDED
Status: DISCONTINUED | OUTPATIENT
Start: 2020-02-10 | End: 2020-02-11 | Stop reason: HOSPADM

## 2020-02-10 RX ORDER — MAGNESIUM CARB/ALUMINUM HYDROX 105-160MG
30 TABLET,CHEWABLE ORAL ONCE
Status: DISCONTINUED | OUTPATIENT
Start: 2020-02-10 | End: 2020-02-11 | Stop reason: HOSPADM

## 2020-02-10 RX ORDER — PENICILLIN G 3000000 [IU]/50ML
3 INJECTION, SOLUTION INTRAVENOUS
Status: DISCONTINUED | OUTPATIENT
Start: 2020-02-10 | End: 2020-02-11 | Stop reason: HOSPADM

## 2020-02-10 RX ORDER — PENICILLIN G 3000000 [IU]/50ML
3 INJECTION, SOLUTION INTRAVENOUS
Status: COMPLETED | OUTPATIENT
Start: 2020-02-10 | End: 2020-02-10

## 2020-02-10 RX ORDER — MORPHINE SULFATE 4 MG/ML
4 INJECTION, SOLUTION INTRAMUSCULAR; INTRAVENOUS ONCE AS NEEDED
Status: DISCONTINUED | OUTPATIENT
Start: 2020-02-10 | End: 2020-02-11 | Stop reason: HOSPADM

## 2020-02-10 RX ORDER — CARBOPROST TROMETHAMINE 250 UG/ML
250 INJECTION, SOLUTION INTRAMUSCULAR AS NEEDED
Status: DISCONTINUED | OUTPATIENT
Start: 2020-02-10 | End: 2020-02-11 | Stop reason: HOSPADM

## 2020-02-10 RX ORDER — PROMETHAZINE HYDROCHLORIDE 12.5 MG/1
12.5 SUPPOSITORY RECTAL EVERY 4 HOURS PRN
Status: DISCONTINUED | OUTPATIENT
Start: 2020-02-10 | End: 2020-02-11 | Stop reason: HOSPADM

## 2020-02-10 RX ORDER — LIDOCAINE HYDROCHLORIDE 10 MG/ML
20 INJECTION, SOLUTION INFILTRATION; PERINEURAL ONCE
Status: DISCONTINUED | OUTPATIENT
Start: 2020-02-11 | End: 2020-02-12 | Stop reason: HOSPADM

## 2020-02-10 RX ORDER — SODIUM CHLORIDE, SODIUM LACTATE, POTASSIUM CHLORIDE, CALCIUM CHLORIDE 600; 310; 30; 20 MG/100ML; MG/100ML; MG/100ML; MG/100ML
125 INJECTION, SOLUTION INTRAVENOUS CONTINUOUS
Status: DISCONTINUED | OUTPATIENT
Start: 2020-02-10 | End: 2020-02-11

## 2020-02-10 RX ORDER — METHYLERGONOVINE MALEATE 0.2 MG/ML
200 INJECTION INTRAVENOUS ONCE AS NEEDED
Status: DISCONTINUED | OUTPATIENT
Start: 2020-02-10 | End: 2020-02-11 | Stop reason: HOSPADM

## 2020-02-10 RX ORDER — MORPHINE SULFATE 4 MG/ML
4 INJECTION, SOLUTION INTRAMUSCULAR; INTRAVENOUS EVERY 4 HOURS PRN
Status: DISCONTINUED | OUTPATIENT
Start: 2020-02-10 | End: 2020-02-11 | Stop reason: HOSPADM

## 2020-02-10 RX ORDER — MORPHINE SULFATE 2 MG/ML
6 INJECTION, SOLUTION INTRAMUSCULAR; INTRAVENOUS EVERY 4 HOURS PRN
Status: DISCONTINUED | OUTPATIENT
Start: 2020-02-10 | End: 2020-02-11 | Stop reason: HOSPADM

## 2020-02-10 RX ORDER — OXYTOCIN/0.9 % SODIUM CHLORIDE 30/500 ML
650 PLASTIC BAG, INJECTION (ML) INTRAVENOUS ONCE
Status: COMPLETED | OUTPATIENT
Start: 2020-02-10 | End: 2020-02-10

## 2020-02-10 RX ORDER — MISOPROSTOL 200 UG/1
800 TABLET ORAL AS NEEDED
Status: DISCONTINUED | OUTPATIENT
Start: 2020-02-10 | End: 2020-02-11 | Stop reason: HOSPADM

## 2020-02-10 RX ORDER — ACETAMINOPHEN 325 MG/1
650 TABLET ORAL ONCE AS NEEDED
Status: COMPLETED | OUTPATIENT
Start: 2020-02-10 | End: 2020-02-11

## 2020-02-10 RX ORDER — LIDOCAINE HYDROCHLORIDE 10 MG/ML
5 INJECTION, SOLUTION EPIDURAL; INFILTRATION; INTRACAUDAL; PERINEURAL AS NEEDED
Status: DISCONTINUED | OUTPATIENT
Start: 2020-02-10 | End: 2020-02-11 | Stop reason: HOSPADM

## 2020-02-10 RX ORDER — PROMETHAZINE HYDROCHLORIDE 25 MG/ML
12.5 INJECTION, SOLUTION INTRAMUSCULAR; INTRAVENOUS EVERY 4 HOURS PRN
Status: DISCONTINUED | OUTPATIENT
Start: 2020-02-10 | End: 2020-02-11 | Stop reason: HOSPADM

## 2020-02-10 RX ORDER — OXYTOCIN/0.9 % SODIUM CHLORIDE 30/500 ML
85 PLASTIC BAG, INJECTION (ML) INTRAVENOUS ONCE
Status: COMPLETED | OUTPATIENT
Start: 2020-02-10 | End: 2020-02-10

## 2020-02-10 RX ORDER — PROMETHAZINE HYDROCHLORIDE 12.5 MG/1
12.5 TABLET ORAL EVERY 4 HOURS PRN
Status: DISCONTINUED | OUTPATIENT
Start: 2020-02-10 | End: 2020-02-11 | Stop reason: HOSPADM

## 2020-02-10 RX ORDER — ONDANSETRON 2 MG/ML
4 INJECTION INTRAMUSCULAR; INTRAVENOUS ONCE AS NEEDED
Status: DISCONTINUED | OUTPATIENT
Start: 2020-02-10 | End: 2020-02-11 | Stop reason: HOSPADM

## 2020-02-10 RX ORDER — ONDANSETRON 4 MG/1
4 TABLET, FILM COATED ORAL ONCE AS NEEDED
Status: DISCONTINUED | OUTPATIENT
Start: 2020-02-10 | End: 2020-02-11 | Stop reason: HOSPADM

## 2020-02-10 RX ORDER — SODIUM CHLORIDE 0.9 % (FLUSH) 0.9 %
3 SYRINGE (ML) INJECTION EVERY 12 HOURS SCHEDULED
Status: DISCONTINUED | OUTPATIENT
Start: 2020-02-10 | End: 2020-02-11 | Stop reason: HOSPADM

## 2020-02-10 RX ORDER — MORPHINE SULFATE 2 MG/ML
2 INJECTION, SOLUTION INTRAMUSCULAR; INTRAVENOUS ONCE AS NEEDED
Status: DISCONTINUED | OUTPATIENT
Start: 2020-02-10 | End: 2020-02-11 | Stop reason: HOSPADM

## 2020-02-10 RX ORDER — HYDROCODONE BITARTRATE AND ACETAMINOPHEN 5; 325 MG/1; MG/1
2 TABLET ORAL ONCE AS NEEDED
Status: DISCONTINUED | OUTPATIENT
Start: 2020-02-10 | End: 2020-02-11 | Stop reason: HOSPADM

## 2020-02-10 RX ORDER — PROMETHAZINE HYDROCHLORIDE 25 MG/ML
25 INJECTION, SOLUTION INTRAMUSCULAR; INTRAVENOUS EVERY 4 HOURS PRN
Status: DISCONTINUED | OUTPATIENT
Start: 2020-02-10 | End: 2020-02-11 | Stop reason: HOSPADM

## 2020-02-10 RX ADMIN — SODIUM CHLORIDE, POTASSIUM CHLORIDE, SODIUM LACTATE AND CALCIUM CHLORIDE 125 ML/HR: 600; 310; 30; 20 INJECTION, SOLUTION INTRAVENOUS at 19:31

## 2020-02-10 RX ADMIN — PENICILLIN G 3 MILLION UNITS: 3000000 INJECTION, SOLUTION INTRAVENOUS at 20:21

## 2020-02-10 RX ADMIN — OXYTOCIN-SODIUM CHLORIDE 0.9% IV SOLN 30 UNIT/500ML 85 ML/HR: 30-0.9/5 SOLUTION at 23:30

## 2020-02-10 RX ADMIN — OXYTOCIN-SODIUM CHLORIDE 0.9% IV SOLN 30 UNIT/500ML 650 ML/HR: 30-0.9/5 SOLUTION at 22:20

## 2020-02-10 RX ADMIN — PENICILLIN G 3 MILLION UNITS: 3000000 INJECTION, SOLUTION INTRAVENOUS at 19:38

## 2020-02-11 LAB
BASOPHILS # BLD AUTO: 0.04 10*3/MM3 (ref 0–0.2)
BASOPHILS NFR BLD AUTO: 0.3 % (ref 0–1.5)
DEPRECATED RDW RBC AUTO: 41.8 FL (ref 37–54)
EOSINOPHIL # BLD AUTO: 0.04 10*3/MM3 (ref 0–0.4)
EOSINOPHIL NFR BLD AUTO: 0.3 % (ref 0.3–6.2)
ERYTHROCYTE [DISTWIDTH] IN BLOOD BY AUTOMATED COUNT: 12 % (ref 12.3–15.4)
HCT VFR BLD AUTO: 36.7 % (ref 34–46.6)
HGB BLD-MCNC: 12.2 G/DL (ref 12–15.9)
IMM GRANULOCYTES # BLD AUTO: 0.05 10*3/MM3 (ref 0–0.05)
IMM GRANULOCYTES NFR BLD AUTO: 0.4 % (ref 0–0.5)
LYMPHOCYTES # BLD AUTO: 2.15 10*3/MM3 (ref 0.7–3.1)
LYMPHOCYTES NFR BLD AUTO: 16.4 % (ref 19.6–45.3)
MCH RBC QN AUTO: 31.8 PG (ref 26.6–33)
MCHC RBC AUTO-ENTMCNC: 33.2 G/DL (ref 31.5–35.7)
MCV RBC AUTO: 95.6 FL (ref 79–97)
MONOCYTES # BLD AUTO: 1.27 10*3/MM3 (ref 0.1–0.9)
MONOCYTES NFR BLD AUTO: 9.7 % (ref 5–12)
NEUTROPHILS # BLD AUTO: 9.52 10*3/MM3 (ref 1.7–7)
NEUTROPHILS NFR BLD AUTO: 72.9 % (ref 42.7–76)
NRBC BLD AUTO-RTO: 0 /100 WBC (ref 0–0.2)
PLATELET # BLD AUTO: 189 10*3/MM3 (ref 140–450)
PMV BLD AUTO: 11.6 FL (ref 6–12)
RBC # BLD AUTO: 3.84 10*6/MM3 (ref 3.77–5.28)
WBC NRBC COR # BLD: 13.07 10*3/MM3 (ref 3.4–10.8)

## 2020-02-11 PROCEDURE — 25010000002 TDAP 5-2.5-18.5 LF-MCG/0.5 SUSPENSION: Performed by: MIDWIFE

## 2020-02-11 PROCEDURE — 85025 COMPLETE CBC W/AUTO DIFF WBC: CPT | Performed by: MIDWIFE

## 2020-02-11 PROCEDURE — 90471 IMMUNIZATION ADMIN: CPT | Performed by: MIDWIFE

## 2020-02-11 PROCEDURE — 90715 TDAP VACCINE 7 YRS/> IM: CPT | Performed by: MIDWIFE

## 2020-02-11 RX ORDER — DOCUSATE SODIUM 100 MG/1
100 CAPSULE, LIQUID FILLED ORAL 2 TIMES DAILY PRN
Status: DISCONTINUED | OUTPATIENT
Start: 2020-02-11 | End: 2020-02-12 | Stop reason: HOSPADM

## 2020-02-11 RX ORDER — PROMETHAZINE HYDROCHLORIDE 25 MG/1
25 TABLET ORAL EVERY 6 HOURS PRN
Status: DISCONTINUED | OUTPATIENT
Start: 2020-02-11 | End: 2020-02-12 | Stop reason: HOSPADM

## 2020-02-11 RX ORDER — ONDANSETRON 2 MG/ML
4 INJECTION INTRAMUSCULAR; INTRAVENOUS EVERY 6 HOURS PRN
Status: DISCONTINUED | OUTPATIENT
Start: 2020-02-11 | End: 2020-02-12 | Stop reason: HOSPADM

## 2020-02-11 RX ORDER — HYDROCODONE BITARTRATE AND ACETAMINOPHEN 7.5; 325 MG/1; MG/1
1 TABLET ORAL EVERY 4 HOURS PRN
Status: DISCONTINUED | OUTPATIENT
Start: 2020-02-11 | End: 2020-02-12 | Stop reason: HOSPADM

## 2020-02-11 RX ORDER — IBUPROFEN 600 MG/1
600 TABLET ORAL EVERY 6 HOURS PRN
Status: DISCONTINUED | OUTPATIENT
Start: 2020-02-11 | End: 2020-02-12 | Stop reason: HOSPADM

## 2020-02-11 RX ORDER — PROMETHAZINE HYDROCHLORIDE 12.5 MG/1
12.5 SUPPOSITORY RECTAL EVERY 6 HOURS PRN
Status: DISCONTINUED | OUTPATIENT
Start: 2020-02-11 | End: 2020-02-12 | Stop reason: HOSPADM

## 2020-02-11 RX ORDER — ZOLPIDEM TARTRATE 5 MG/1
5 TABLET ORAL NIGHTLY PRN
Status: DISCONTINUED | OUTPATIENT
Start: 2020-02-11 | End: 2020-02-12 | Stop reason: HOSPADM

## 2020-02-11 RX ORDER — PRENATAL VIT/IRON FUM/FOLIC AC 27MG-0.8MG
1 TABLET ORAL DAILY
Status: DISCONTINUED | OUTPATIENT
Start: 2020-02-11 | End: 2020-02-12 | Stop reason: HOSPADM

## 2020-02-11 RX ORDER — PROMETHAZINE HYDROCHLORIDE 25 MG/ML
12.5 INJECTION, SOLUTION INTRAMUSCULAR; INTRAVENOUS EVERY 6 HOURS PRN
Status: DISCONTINUED | OUTPATIENT
Start: 2020-02-11 | End: 2020-02-12 | Stop reason: HOSPADM

## 2020-02-11 RX ORDER — HYDROCODONE BITARTRATE AND ACETAMINOPHEN 5; 325 MG/1; MG/1
1 TABLET ORAL EVERY 4 HOURS PRN
Status: DISCONTINUED | OUTPATIENT
Start: 2020-02-11 | End: 2020-02-12 | Stop reason: HOSPADM

## 2020-02-11 RX ORDER — BISACODYL 10 MG
10 SUPPOSITORY, RECTAL RECTAL DAILY PRN
Status: DISCONTINUED | OUTPATIENT
Start: 2020-02-11 | End: 2020-02-12 | Stop reason: HOSPADM

## 2020-02-11 RX ORDER — ONDANSETRON 4 MG/1
4 TABLET, FILM COATED ORAL EVERY 8 HOURS PRN
Status: DISCONTINUED | OUTPATIENT
Start: 2020-02-11 | End: 2020-02-12 | Stop reason: HOSPADM

## 2020-02-11 RX ORDER — SODIUM CHLORIDE 0.9 % (FLUSH) 0.9 %
1-10 SYRINGE (ML) INJECTION AS NEEDED
Status: DISCONTINUED | OUTPATIENT
Start: 2020-02-11 | End: 2020-02-12 | Stop reason: HOSPADM

## 2020-02-11 RX ADMIN — PRENATAL VIT W/ FE FUMARATE-FA TAB 27-0.8 MG 1 TABLET: 27-0.8 TAB at 09:30

## 2020-02-11 RX ADMIN — BENZOCAINE AND MENTHOL: 20; .5 SPRAY TOPICAL at 03:28

## 2020-02-11 RX ADMIN — IBUPROFEN 600 MG: 600 TABLET ORAL at 09:30

## 2020-02-11 RX ADMIN — ACETAMINOPHEN 650 MG: 325 TABLET, FILM COATED ORAL at 00:00

## 2020-02-11 RX ADMIN — TETANUS TOXOID, REDUCED DIPHTHERIA TOXOID AND ACELLULAR PERTUSSIS VACCINE, ADSORBED 0.5 ML: 5; 2.5; 8; 8; 2.5 SUSPENSION INTRAMUSCULAR at 05:42

## 2020-02-11 RX ADMIN — IBUPROFEN 600 MG: 600 TABLET ORAL at 16:51

## 2020-02-11 NOTE — NON STRESS TEST
"  Hanna Chavez, a  at 37w0d with an SAURABH of 3/2/2020, by Ultrasound, was seen at [facility] for a nonstress test.    Chief Complaint   Patient presents with   • Contractions     \"I've been having contractions for about 4 hours, l think they are about 10 minutes apart\"       Patient Active Problem List   Diagnosis   • SGA (small for gestational age), fetal, affecting care of mother, antepartum, third trimester, other fetus   • Pregnancy       Start Time: 1800  Stop Time: 182    Interpretation A  Nonstress Test Interpretation A: Reactive (02/10/20 1935 : Lesley Waller, RN)          "

## 2020-02-11 NOTE — PLAN OF CARE
Problem: Postpartum (Vaginal Delivery) (Adult,Obstetrics,Pediatric)  Goal: Signs and Symptoms of Listed Potential Problems Will be Absent, Minimized or Managed (Postpartum)  Outcome: Ongoing (interventions implemented as appropriate)  Flowsheets (Taken 2/11/2020 0207)  Problems Assessed (Postpartum Vaginal Delivery): all  Problems Present (Postpartum Vag Deliv): none     Problem: Patient Care Overview  Goal: Plan of Care Review  Flowsheets  Taken 2/11/2020 0030  Plan of Care Reviewed With: patient  Taken 2/11/2020 0213  Patient Agreement with Plan of Care: agrees  Note:   Pt stable, doing well, family in room

## 2020-02-11 NOTE — PROGRESS NOTES
[facility]    POSTPARTUM DAY 1      Subjective     Patient reports:   Doing well.  Voiding and ambulating without difficulty.    Reg diet & tolerating.  Lochia not heavy.          Objective      Vitals: Vital Signs Range for the last 24 hours  Temperature: Temp:  [97.5 °F (36.4 °C)-98.7 °F (37.1 °C)] 97.7 °F (36.5 °C)   Temp Source: Temp src: Oral   BP: BP: (117-140)/(69-99) 140/88   Pulse: Heart Rate:  [] 75   Respirations: Resp:  [16-18] 18   SPO2: SpO2:  [97 %-100 %] 97 %   O2 Amount (l/min):     O2 Devices Device (Oxygen Therapy): room air   Weight: Weight:  [56.7 kg (125 lb)] 56.7 kg (125 lb)              PHYSICAL EXAM:    Psych: Altert and oriented to time, place and person  Mood and affect appropriate   General: well developed; well nourished  no acute distress  Lungs:  breathing is unlabored  Abdomen: fundus firm - below the umbilicus  Lower Extremities: no calf tenderness                                                                                                                        I reviewed the patient's new clinical results.    @ASSESSMENT PLANBEGIN@       Pregnancy     (spontaneous vaginal delivery)      Assessment:    Hanna Chavez is Day 1  post-partum vaginal delivery  Vaginal, Spontaneous    .       Plan:  Continue PP Orders.  Anticipate D/C home tomorrow.    Jennifer Olivas CNM  2020  9:15 AM

## 2020-02-11 NOTE — H&P
"KENDRA Irene  Hanna Chavez  : 1995  MRN: [id number]  CSN: [id number]    History and Physical    Subjective   Hanna Chavez is a 24 y.o. year old  with an Estimated Date of Delivery: 3/2/20 currently at 37w0d presenting with irregular contractions, leaking fluid and normal fetal movement. She states contractions started about 1300. She states she has been leaking fluid for 2 days but mostly when she coughs. Baby has been active. She ambulated x 1 hour and stated contractions seemed worse.    Prenatal care has been with [facility].  It has been complicated by anemia. First PNV on 19 @ 9 weeks with 9 visits. Weight gain = 28 lbs.    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    Respiratory: Negative.    Cardiovascular: Negative.    Gastrointestinal: Negative.    Musculoskeletal: Negative.    Skin: Negative.    Neurological: Negative.    Psychiatric/Behavioral: Negative.    All other systems reviewed and are negative.        Objective   Resp 16   Ht 157.5 cm (62\")   Wt 56.7 kg (125 lb)   LMP 2019 (Approximate)   BMI 22.86 kg/m²   General: well developed; well nourished  no acute distress   Neck:    Heart:   supple and no masses  normal apical impulse  regular rate and rhythm   Lungs: breathing is unlabored  clear to auscultation bilaterally   Abdomen: soft, " non-tender; no masses  no umbilical or inguinal hernias are present  gravid  EFW: 6#   FHT's: reassuring, reactive and category 1   Cervix: was checked (by me): 2 cm / 60 % / 0 upon arrival, 4/80/0 posterior after ambulation   Presentation: cephalic   Contractions: irregular every 2-5 minutes - external monitors used   Extremities:   normal appearance with no cyanosis or edema and no calf tenderness   Skin:  Skin color, texture, turgor normal. No rashes or lesions or Skin warm and dry   Psych:  Normal. and Alert and oriented, appropriate affect.     Prenatal Labs  Lab Results   Component Value Date    HGB 10.4 (L) 2019    HEPBSAG Negative 2019    ABSCRN Negative 2019    HOY7CBE7 Non Reactive 2019    HEPCVIRUSABY <0.1 2019    STREPGPB Positive (A) 2020       Current Labs Reviewed   Lab Results (last 24 hours)     Procedure Component Value Units Date/Time    PAMG-1, Rapid Assay For ROM - Amniotic Fluid, [[id number]]  (Normal) Collected:  02/10/20 1842    Specimen:  Amniotic Fluid Updated:  02/10/20 1907     Rupture of Membranes Negative    Narrative:       Performed by Lesley Waller RN    POC Urinalysis Dipstick [[id number]]  (Normal) Collected:  02/10/20 1821    Specimen:  Urine Updated:  02/10/20 182     Color Yellow     Clarity, UA Clear     Glucose, UA Negative mg/dL      Bilirubin Negative     Ketones, UA Negative     Specific Gravity  1.010     Blood, UA Negative     pH, Urine 6.5     Protein, POC Negative mg/dL      Urobilinogen, UA Normal     Leukocytes Negative     Nitrite, UA Negative             ASSESSMENT  1. IUP at 37w0d  2. Group B strep status: positive  3. Reactive NST  4. Labor    PLAN  1. Admit to   2. Expectant management  3. Medicate for pain if desires   4. GBS protocol  5. Anticipate     Iram Condon, EZEKIEL  2/10/2020  7:18 PM

## 2020-02-11 NOTE — L&D DELIVERY NOTE
[facility]  Vaginal Delivery Note    Delivery     Delivery: Vaginal, Spontaneous     YOB: 2021    Time of Birth:  Gestational Age 10:16 PM   37w0d     Anesthesia: Local     Delivering clinician: Iram Condon    Forceps?   No   Vacuum? No    Shoulder dystocia present: No        Delivery narrative:  Hanna progressed to C+P and pushed effectively. AROM clear fluid just prior to pushing. Mouth and nose bulb suctioned on perineum. Tight Nuchal cord double clamped and cut.  viable male. Infant stimulated, dried, and placed on mom's abdomen. Cord clamp applied and cord trimmed by dad. Cord blood obtained. Placenta delivered Schultze intact. Pitocin 20 u IV. 1st degree perineal laceration repaired after local anesthetic. FF @ U, light rubra lochia.     Infant    Findings: male  infant     Infant observations: Weight: 2580 g (5 lb 11 oz)   Length: 18  in  Observations/Comments:         Apgars: 8   @ 1 minute /    9   @ 5 minutes   Infant Name: Jun     Placenta, Cord, and Fluid    Placenta delivered  Spontaneous  at   2/10/2020 10:20 PM     Cord:    present.   Nuchal Cord?  yes; Number of nuchal loops present:  1   Cord blood obtained:      Cord gases obtained:       Cord gas results: Venous:  No results found for: PHCVEN    Arterial:  No results found for: PHCART     Repair    Episiotomy: none    No    Lacerations: Yes  Laceration Information  Laceration Repaired?   Perineal: 1st    yes   Periurethral:         Labial:         Sulcus:         Vaginal:         Cervical:           Suture used for repair: 3-0 chromic gut   Estimated Blood Loss: Est. Blood Loss (mL): 250 mL(Filed from Delivery Summary) (02/10/20 3479)           Complications  none    Disposition  Mother to Mother Baby/Postpartum  in stable condition currently.  Baby  remains with mom  in stable condition currently.      Iram Condon CNM  02/10/20  10:52 PM

## 2020-02-11 NOTE — PLAN OF CARE
Problem: Postpartum (Vaginal Delivery) (Adult,Obstetrics,Pediatric)  Goal: Signs and Symptoms of Listed Potential Problems Will be Absent, Minimized or Managed (Postpartum)  Outcome: Ongoing (interventions implemented as appropriate)  Flowsheets (Taken 2/11/2020 0207)  Problems Assessed (Postpartum Vaginal Delivery): all  Problems Present (Postpartum Vag Deliv): none

## 2020-02-11 NOTE — PLAN OF CARE
Problem: Postpartum (Vaginal Delivery) (Adult,Obstetrics,Pediatric)  Goal: Signs and Symptoms of Listed Potential Problems Will be Absent, Minimized or Managed (Postpartum)  Outcome: Ongoing (interventions implemented as appropriate)  Flowsheets (Taken 2/11/2020 0207 by Esme Brown RN)  Problems Assessed (Postpartum Vaginal Delivery): all  Problems Present (Postpartum Vag Deliv): none     VSS, Pain controlled. Positve maternal infant bonding abserved.

## 2020-02-12 VITALS
OXYGEN SATURATION: 98 % | RESPIRATION RATE: 18 BRPM | BODY MASS INDEX: 23 KG/M2 | DIASTOLIC BLOOD PRESSURE: 75 MMHG | TEMPERATURE: 98.1 F | WEIGHT: 125 LBS | HEIGHT: 62 IN | SYSTOLIC BLOOD PRESSURE: 131 MMHG | HEART RATE: 73 BPM

## 2020-02-12 PROCEDURE — 99024 POSTOP FOLLOW-UP VISIT: CPT | Performed by: MIDWIFE

## 2020-02-12 RX ORDER — IBUPROFEN 600 MG/1
600 TABLET ORAL EVERY 6 HOURS PRN
Qty: 60 TABLET | Refills: 0 | Status: SHIPPED | OUTPATIENT
Start: 2020-02-12 | End: 2023-01-23

## 2020-02-12 RX ADMIN — IBUPROFEN 600 MG: 600 TABLET ORAL at 02:59

## 2020-02-12 RX ADMIN — DOCUSATE SODIUM 100 MG: 100 CAPSULE, LIQUID FILLED ORAL at 08:26

## 2020-02-12 RX ADMIN — PRENATAL VIT W/ FE FUMARATE-FA TAB 27-0.8 MG 1 TABLET: 27-0.8 TAB at 08:26

## 2020-02-12 NOTE — PLAN OF CARE
Problem: Patient Care Overview  Goal: Plan of Care Review  Outcome: Ongoing (interventions implemented as appropriate)  Flowsheets  Taken 2/12/2020 0456 by India Molina, RN  Progress: improving  Outcome Summary: VSS, adequate pain control, anticipate d/c  Taken 2/11/2020 0030 by Esme Brown, RN  Plan of Care Reviewed With: patient  Taken 2/11/2020 0213 by Esme Brown RN  Patient Agreement with Plan of Care: agrees

## 2020-02-12 NOTE — DISCHARGE SUMMARY
Discharge Summary     Teto  Hanna Chavez  : 1995  MRN: [id number]  [facility]: [id number]    Date of Admission: 2/10/2020   Date of Discharge:  2020   Delivering Physician: Iram Condon        Admission Diagnosis: 1. Pregnancy [Z34.90]   Discharge Diagnosis: 1. Same as above plus  2. Pregnancy at 37w0d - delivered       Procedures: 2/10/2020  - Vaginal, Spontaneous       Hospital Course  Patient is a 24 y.o.  who at 37w0d had a vaginal birth.  Her postpartum course was without complications.  On PPD #2 she was ready for discharge.  She had normal lochia and pain well controlled with oral medications. She is breastfeeding and it is going well.    Infant  male  fetus weighing 2580 g (5 lb 11 oz)   Apgars -  8  @ 1 minute /  9  @ 5 minutes.    Discharge labs  Lab Results   Component Value Date    WBC 13.07 (H) 2020    HGB 12.2 2020    HCT 36.7 2020     2020       Discharge Medications     Discharge Medications      New Medications      Instructions Start Date   ibuprofen 600 MG tablet  Commonly known as:  ADVIL,MOTRIN   600 mg, Oral, Every 6 Hours PRN         Continue These Medications      Instructions Start Date   prenatal (CLASSIC) vitamin 28-0.8 MG tablet tablet   1 tablet, Oral, Daily         Stop These Medications    ferrous sulfate 325 (65 FE) MG tablet            Discharge Disposition Home or Self Care   Condition on Discharge: good   Follow-up: 6 weeks with [facility]     Time spent on discharge: 30 minutes or less  Iram Condon, EZEKIEL  2020

## 2020-02-12 NOTE — PLAN OF CARE
Problem: Postpartum (Vaginal Delivery) (Adult,Obstetrics,Pediatric)  Goal: Signs and Symptoms of Listed Potential Problems Will be Absent, Minimized or Managed (Postpartum)  Outcome: Outcome(s) achieved   VSS. Positive bonding observed with mob and NB.

## 2021-03-23 ENCOUNTER — BULK ORDERING (OUTPATIENT)
Dept: CASE MANAGEMENT | Facility: OTHER | Age: 26
End: 2021-03-23

## 2021-03-23 DIAGNOSIS — Z23 IMMUNIZATION DUE: ICD-10-CM

## 2021-04-02 ENCOUNTER — OFFICE VISIT (OUTPATIENT)
Dept: OBSTETRICS AND GYNECOLOGY | Facility: CLINIC | Age: 26
End: 2021-04-02

## 2021-04-02 VITALS
BODY MASS INDEX: 17.85 KG/M2 | WEIGHT: 97 LBS | DIASTOLIC BLOOD PRESSURE: 62 MMHG | SYSTOLIC BLOOD PRESSURE: 112 MMHG | HEIGHT: 62 IN

## 2021-04-02 DIAGNOSIS — Z01.419 ENCOUNTER FOR GYNECOLOGICAL EXAMINATION WITHOUT ABNORMAL FINDING: Primary | ICD-10-CM

## 2021-04-02 DIAGNOSIS — Z11.3 SCREENING FOR STD (SEXUALLY TRANSMITTED DISEASE): ICD-10-CM

## 2021-04-02 DIAGNOSIS — Z30.011 ENCOUNTER FOR INITIAL PRESCRIPTION OF CONTRACEPTIVE PILLS: ICD-10-CM

## 2021-04-02 DIAGNOSIS — Z12.4 SCREENING FOR MALIGNANT NEOPLASM OF CERVIX: ICD-10-CM

## 2021-04-02 PROBLEM — O36.5939 SGA (SMALL FOR GESTATIONAL AGE), FETAL, AFFECTING CARE OF MOTHER, ANTEPARTUM, THIRD TRIMESTER, OTHER FETUS: Status: RESOLVED | Noted: 2020-02-04 | Resolved: 2021-04-02

## 2021-04-02 PROBLEM — Z34.90 PREGNANCY: Status: RESOLVED | Noted: 2020-02-10 | Resolved: 2021-04-02

## 2021-04-02 PROCEDURE — 99395 PREV VISIT EST AGE 18-39: CPT | Performed by: MIDWIFE

## 2021-04-02 RX ORDER — NORGESTIMATE AND ETHINYL ESTRADIOL 0.25-0.035
1 KIT ORAL DAILY
Qty: 28 TABLET | Refills: 12 | Status: SHIPPED | OUTPATIENT
Start: 2021-04-02 | End: 2022-03-25 | Stop reason: SDUPTHER

## 2021-04-02 NOTE — PROGRESS NOTES
"Subjective   Chief Complaint   Patient presents with   • Gynecologic Exam       Annual. LMP 3/15/2021, Last pap 2019. Patient was not seen for 6 week pp check.  2/10/2020 D Foster delivered.      Hanna Chavez is a 25 y.o. year old  presenting to be seen for her annual exam.  She just quit breast-feeding in March and had her first period on 3/15/2021.  She is currently using condoms for birth control and is considering LARC.  She wants to start OCs with next period.    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    Review of Systems   Constitutional: Negative.    Respiratory: Negative.    Cardiovascular: Negative.    Musculoskeletal: Negative.    Psychiatric/Behavioral: Negative.    All other systems reviewed and are negative.          Objective   /62   Ht 157.5 cm (62\")   Wt 44 kg (97 lb)   LMP 03/15/2021   Breastfeeding No   BMI 17.74 kg/m²     Physical Exam   Constitutional   The patient is awake, alert, well developed, well nourished and well groomed.   Neck   The neck is supple and the trachea is midline. The thyroid is not enlarged and there are no palpable nodules.   Breast  Inspection of the breast reveals symmetrical and smooth breast bilaterally without skin color changes, lesions, dimpling or skin retraction.  The nipples are  everted and without discharge.  Palpation of the breast the tissue is non-tender, smooth, without masses.  The axillae are without masses.   Respiratory  The patient is relaxed and breathes without effort. Lungs CTAB  Cardiovascular  Heart regular rate and rhythm without murmur.  Gastrointestinal   The " abdomen is soft and non-tender.  No hepatosplenomegaly.  Genitourinary   - External Genitalia without erythema, lesions, or masses  -Urethral Meatus is without erythema, edema, prolapse or lesions.   -Bladder - Palpation at the region above the symphysis pubis             reveals no bladder tenderness.   -Vagina - There is no excessive vaginal discharge.&    vaginal walls reveals moist vaginal mucosa without inflammation or lesions    There is no evidence of pelvic relaxation; there is no cystocele or rectocele.  -Cervix  is smooth, pink, and without discharge. Negative cervical motion tenderness   Uterus - uterine body is of normal size, shape, & without tenderness  Adnexa structures are nontender and without masses  Perineum is without inflammation or lesions   Extremities  Full ROM. No cyanosis or edema   Skin  Normal in color, texture, moisture, temperature, mobility and turgor. There are no abnormal lesions.    Psychiatric  The patient is oriented to person, place, and time. Speech is fluent and words are clear          Assessment /Plan      Diagnoses and all orders for this visit:    1. Encounter for gynecological examination without abnormal finding (Primary)  -     Liquid-based Pap Smear, Screening; Future        -     Screening for STDs  2. Encounter for initial prescription of contraceptive pills      She was instructed how to start her birth control.  It should be started on Sunday following the onset of her next cycle.  The patient was educated regarding the correct and consistent use.  Because it may take 1 month to become effective, the use of alternative contraception for one month was stressed.  The potential for breakthrough bleeding for up to 4 cycles was also emphasized.  In addition, the patient was counseled regarding hormonal contraception not providing protecting against sexually transmitted infections and the need for safe sex practices.  Other orders  -     norgestimate-ethinyl estradiol  (ORTHO-CYCLEN) 0.25-35 MG-MCG per tablet; Take 1 tablet by mouth Daily for 364 days.  Dispense: 28 tablet; Refill: 12    She was advised to call the office if she decides to do LARC.         Encouraged self breast exam, healthy eating and exercise    This note was electronically signed.    Iram Condon CNM   April 2, 2021

## 2021-04-19 DIAGNOSIS — Z01.419 ENCOUNTER FOR GYNECOLOGICAL EXAMINATION WITHOUT ABNORMAL FINDING: ICD-10-CM

## 2022-03-25 RX ORDER — NORGESTIMATE AND ETHINYL ESTRADIOL 0.25-0.035
1 KIT ORAL DAILY
Qty: 28 TABLET | Refills: 1 | Status: SHIPPED | OUTPATIENT
Start: 2022-03-25 | End: 2022-04-21 | Stop reason: SDUPTHER

## 2022-04-21 RX ORDER — NORGESTIMATE AND ETHINYL ESTRADIOL 0.25-0.035
1 KIT ORAL DAILY
Qty: 28 TABLET | Refills: 1 | Status: SHIPPED | OUTPATIENT
Start: 2022-04-21 | End: 2022-05-16 | Stop reason: SDUPTHER

## 2022-05-16 RX ORDER — NORGESTIMATE AND ETHINYL ESTRADIOL 0.25-0.035
1 KIT ORAL DAILY
Qty: 28 TABLET | Refills: 1 | Status: SHIPPED | OUTPATIENT
Start: 2022-05-16 | End: 2022-07-14 | Stop reason: SDUPTHER

## 2022-07-15 RX ORDER — NORGESTIMATE AND ETHINYL ESTRADIOL 0.25-0.035
1 KIT ORAL DAILY
Qty: 28 TABLET | Refills: 3 | Status: SHIPPED | OUTPATIENT
Start: 2022-07-15 | End: 2022-09-30 | Stop reason: SDUPTHER

## 2022-08-08 ENCOUNTER — OFFICE VISIT (OUTPATIENT)
Dept: OBSTETRICS AND GYNECOLOGY | Facility: CLINIC | Age: 27
End: 2022-08-08

## 2022-08-08 VITALS
SYSTOLIC BLOOD PRESSURE: 100 MMHG | DIASTOLIC BLOOD PRESSURE: 68 MMHG | BODY MASS INDEX: 17.24 KG/M2 | WEIGHT: 101 LBS | HEIGHT: 64 IN

## 2022-08-08 DIAGNOSIS — N63.21 BREAST LUMP ON LEFT SIDE AT 2 O'CLOCK POSITION: Primary | ICD-10-CM

## 2022-08-08 PROCEDURE — 99212 OFFICE O/P EST SF 10 MIN: CPT | Performed by: PHYSICIAN ASSISTANT

## 2022-08-08 NOTE — PROGRESS NOTES
"Subjective   Chief Complaint   Patient presents with   • Breast Problem     C/O sore nodule in left breast       Hanna Chavez is a 27 y.o. year old  presenting to be seen for left breast lump.  Patient stated she noted the lump this past Thursday after her 2-year-old poked her in the breast with his finger.  She felt some pain when her 2-year-old accidentally poked her breast and she went to feel the area and felt a lump.  She had not had any pain or noticed breast lump prior to last Thursday.  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   Gastrointestinal: Negative.    Genitourinary: Negative for difficulty urinating, dysuria, menstrual problem and pelvic pain.           Objective   /68   Ht 162.6 cm (64\")   Wt 45.8 kg (101 lb)   LMP 2022 (Approximate)   Breastfeeding No   BMI 17.34 kg/m²     Physical Exam  Constitutional:       Appearance: Normal appearance. She is well-developed and " well-groomed.   Eyes:      General: Lids are normal.      Extraocular Movements: Extraocular movements intact.      Conjunctiva/sclera: Conjunctivae normal.   Chest:   Breasts:      Right: No inverted nipple, mass, nipple discharge, skin change or tenderness.      Left: Tenderness present. No inverted nipple, mass, nipple discharge or skin change.            Comments: 2 cm mobile soft flattened nodule left breast at 2 oclock  Small bruise noted over area  Neurological:      Mental Status: She is alert.   Psychiatric:         Attention and Perception: Attention normal.         Mood and Affect: Mood normal.         Speech: Speech normal.         Behavior: Behavior is cooperative.            Result Review :                   Assessment and Plan  Diagnoses and all orders for this visit:    1. Breast lump on left side at 2 o'clock position (Primary)  -     US Breast Left Limited; Future      Patient Instructions   Exam feels to be consistent with left breast cyst and I think is coincidental to child poking her in breast. Will obtain ultrasound of left breast for further evaluation             This note was electronically signed.    Farhana Washburn PA-C   August 8, 2022

## 2022-08-08 NOTE — PATIENT INSTRUCTIONS
Exam feels to be consistent with left breast cyst and I think is coincidental to child poking her in breast. Will obtain ultrasound of left breast for further evaluation

## 2022-09-15 ENCOUNTER — HOSPITAL ENCOUNTER (OUTPATIENT)
Dept: ULTRASOUND IMAGING | Facility: HOSPITAL | Age: 27
End: 2022-09-15

## 2022-09-30 ENCOUNTER — TELEPHONE (OUTPATIENT)
Dept: OBSTETRICS AND GYNECOLOGY | Facility: CLINIC | Age: 27
End: 2022-09-30

## 2022-09-30 RX ORDER — NORGESTIMATE AND ETHINYL ESTRADIOL 0.25-0.035
1 KIT ORAL DAILY
Qty: 28 TABLET | Refills: 3 | Status: SHIPPED | OUTPATIENT
Start: 2022-09-30 | End: 2022-12-22 | Stop reason: SDUPTHER

## 2022-09-30 NOTE — TELEPHONE ENCOUNTER
----- Message from Anabel Madden sent at 9/30/2022  8:44 AM EDT -----  PATIENT NEEDING REFILL ON BC UNTIL ANNUAL APPOINTMENT 12/27/22. PATIENT HAD TO R/S DUE TO MENSES.      [facility]

## 2022-12-22 RX ORDER — NORGESTIMATE AND ETHINYL ESTRADIOL 0.25-0.035
1 KIT ORAL DAILY
Qty: 28 TABLET | Refills: 0 | Status: SHIPPED | OUTPATIENT
Start: 2022-12-22 | End: 2023-01-23 | Stop reason: SDUPTHER

## 2022-12-27 ENCOUNTER — TELEPHONE (OUTPATIENT)
Dept: OBSTETRICS AND GYNECOLOGY | Facility: CLINIC | Age: 27
End: 2022-12-27

## 2022-12-27 NOTE — TELEPHONE ENCOUNTER
Provider: BRIAN ALVARADO  Caller: HINA LUNSFORD  Relationship to Patient: SELF  Reason for Call: SAME DAY CANCEL- WEATHER/ROAD CONDITIONS.

## 2023-01-23 ENCOUNTER — OFFICE VISIT (OUTPATIENT)
Dept: OBSTETRICS AND GYNECOLOGY | Facility: CLINIC | Age: 28
End: 2023-01-23
Payer: COMMERCIAL

## 2023-01-23 VITALS
SYSTOLIC BLOOD PRESSURE: 110 MMHG | BODY MASS INDEX: 17.79 KG/M2 | WEIGHT: 104.2 LBS | HEIGHT: 64 IN | DIASTOLIC BLOOD PRESSURE: 72 MMHG

## 2023-01-23 DIAGNOSIS — Z12.4 SCREENING FOR CERVICAL CANCER: ICD-10-CM

## 2023-01-23 DIAGNOSIS — Z01.419 WELL WOMAN EXAM WITH ROUTINE GYNECOLOGICAL EXAM: Primary | ICD-10-CM

## 2023-01-23 DIAGNOSIS — Z30.41 ENCOUNTER FOR SURVEILLANCE OF CONTRACEPTIVE PILLS: ICD-10-CM

## 2023-01-23 PROCEDURE — 99395 PREV VISIT EST AGE 18-39: CPT | Performed by: PHYSICIAN ASSISTANT

## 2023-01-23 RX ORDER — NORGESTIMATE AND ETHINYL ESTRADIOL 0.25-0.035
1 KIT ORAL DAILY
Qty: 84 TABLET | Refills: 3 | Status: SHIPPED | OUTPATIENT
Start: 2023-01-23 | End: 2023-02-18 | Stop reason: SDUPTHER

## 2023-01-23 NOTE — PROGRESS NOTES
"Subjective   Chief Complaint   Patient presents with   • Gynecologic Exam     Last pap done 21-non-diagnostic, requesting a refill on birth control, No complaints       Hanna Chavez is a 27 y.o. year old  presenting to be seen for her annual gynecological exam.   Has no complaints  Desires refills of Orthocyclen ocp. 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   Gastrointestinal: Negative.    Genitourinary: Negative for difficulty urinating, dysuria, menstrual problem and pelvic pain.           Objective   /72   Ht 162.6 cm (64\")   Wt 47.3 kg (104 lb 3.2 oz)   LMP 2022 (Approximate)   BMI 17.89 kg/m²     Physical Exam  Exam conducted with a chaperone present.   Constitutional:       Appearance: Normal appearance. She is well-developed and well-groomed.   Eyes:      General: Lids are normal.      Extraocular Movements: Extraocular movements intact.      Conjunctiva/sclera: Conjunctivae normal.   Neck:      Thyroid: No thyroid mass.   Chest:   Breasts:     Breasts " are symmetrical.      Right: No inverted nipple, mass, nipple discharge, skin change or tenderness.      Left: No inverted nipple, mass, nipple discharge, skin change or tenderness.   Abdominal:      General: There is no distension.      Palpations: Abdomen is soft. There is no hepatomegaly or splenomegaly.      Tenderness: There is no abdominal tenderness.   Genitourinary:     Exam position: Lithotomy position.      Labia:         Right: No rash, tenderness or lesion.         Left: No rash, tenderness or lesion.       Urethra: No prolapse, urethral pain, urethral swelling or urethral lesion.      Vagina: No vaginal discharge, tenderness or lesions.      Cervix: No cervical motion tenderness, discharge, friability or lesion.      Uterus: Not enlarged and not tender.       Adnexa:         Right: No mass or tenderness.          Left: No mass or tenderness.     Musculoskeletal:      Cervical back: Neck supple.   Lymphadenopathy:      Upper Body:      Right upper body: No axillary adenopathy.      Left upper body: No axillary adenopathy.   Skin:     General: Skin is warm and dry.      Findings: No lesion.   Neurological:      General: No focal deficit present.      Mental Status: She is alert and oriented to person, place, and time.   Psychiatric:         Attention and Perception: Attention normal.         Mood and Affect: Mood normal.         Speech: Speech normal.         Behavior: Behavior normal.         Thought Content: Thought content normal.            Result Review :                   Assessment and Plan  Diagnoses and all orders for this visit:    1. Well woman exam with routine gynecological exam (Primary)    2. Screening for cervical cancer  -     LIQUID-BASED PAP SMEAR, [facility] ([facility])    3. Encounter for surveillance of contraceptive pills    Other orders  -     norgestimate-ethinyl estradiol (ORTHO-CYCLEN) 0.25-35 MG-MCG per tablet; Take 1 tablet by mouth Daily for 364 days.  Dispense: 84 tablet;  Refill: 3      Patient Instructions   Self breast exam monthly  Regular exercise               This note was electronically signed.    Farhana Washburn PA-C   January 23, 2023

## 2023-01-25 LAB — REF LAB TEST METHOD: NORMAL

## 2023-02-20 RX ORDER — NORGESTIMATE AND ETHINYL ESTRADIOL 0.25-0.035
1 KIT ORAL DAILY
Qty: 84 TABLET | Refills: 3 | Status: SHIPPED | OUTPATIENT
Start: 2023-02-20 | End: 2024-02-19

## 2023-03-24 RX ORDER — NORGESTIMATE AND ETHINYL ESTRADIOL 0.25-0.035
1 KIT ORAL DAILY
Qty: 84 TABLET | Refills: 3 | OUTPATIENT
Start: 2023-03-24 | End: 2024-03-22

## 2024-02-14 RX ORDER — NORGESTIMATE AND ETHINYL ESTRADIOL 0.25-0.035
1 KIT ORAL DAILY
Qty: 84 TABLET | Refills: 0 | Status: SHIPPED | OUTPATIENT
Start: 2024-02-14

## 2024-05-16 RX ORDER — NORGESTIMATE AND ETHINYL ESTRADIOL 0.25-0.035
1 KIT ORAL DAILY
Qty: 84 TABLET | Refills: 0 | Status: SHIPPED | OUTPATIENT
Start: 2024-05-16

## 2024-05-16 NOTE — TELEPHONE ENCOUNTER
Hilda Rodrigues [facility]  Caller: Unspecified (Today,  2:49 PM)  Pt savannah'd her annual for 07/05 ([facility] 1st opening). She is requesting her birth control to be refilled until her appt.    [facility]

## 2024-07-15 RX ORDER — NORGESTIMATE AND ETHINYL ESTRADIOL 0.25-0.035
1 KIT ORAL DAILY
Qty: 84 TABLET | Refills: 0 | Status: SHIPPED | OUTPATIENT
Start: 2024-07-15